# Patient Record
Sex: FEMALE | Race: WHITE | URBAN - METROPOLITAN AREA
[De-identification: names, ages, dates, MRNs, and addresses within clinical notes are randomized per-mention and may not be internally consistent; named-entity substitution may affect disease eponyms.]

---

## 2018-03-21 ENCOUNTER — OFFICE VISIT (OUTPATIENT)
Dept: ORTHOPEDIC SURGERY | Age: 68
End: 2018-03-21

## 2018-03-21 DIAGNOSIS — Z96.642 STATUS POST TOTAL REPLACEMENT OF LEFT HIP: Primary | ICD-10-CM

## 2018-03-21 DIAGNOSIS — R52 PAIN: ICD-10-CM

## 2018-03-21 DIAGNOSIS — Z96.641 STATUS POST TOTAL REPLACEMENT OF RIGHT HIP: ICD-10-CM

## 2018-03-21 PROCEDURE — 99203 OFFICE O/P NEW LOW 30 MIN: CPT | Performed by: ORTHOPAEDIC SURGERY

## 2018-03-21 RX ORDER — CELECOXIB 200 MG/1
CAPSULE ORAL
Refills: 3 | COMMUNITY
Start: 2018-02-24

## 2018-03-21 ASSESSMENT — ENCOUNTER SYMPTOMS
GASTROINTESTINAL NEGATIVE: 1
EYES NEGATIVE: 1
RESPIRATORY NEGATIVE: 1

## 2018-03-21 NOTE — PROGRESS NOTES
Scribe Authentication Statement  Herman Sicard, scribed portions of this documentation for and in the presence of Dr. Nick Frankel DO on 3/21/18 at 10:47 AM.    Provider Authentication Statement:  I, Festus Gasca DO, personally performed the services described in this documentation and they were scribed in my presence by the above listed scribe. The documentation is both accurate and complete. ORTHOPEDIC EVALUATION      History of Present Illness (HPI):   Patient's PCP is listed as: No primary care provider on file. .    This is a:  []Consult Visit     [x]New Patient Visit     []Established Patient Visit    Colin Davis is a 78y.o. year old female evaluated for  Chief Complaint   Patient presents with    Follow-up     bilateral hips     Patient is here to establish orthopedic care. She was seen in [place] by another orthopedic surgeon who performed a hip total hip replacement on the right and a total hip replacement on the left. She had no difficulties or problems with these. She moved to [place] recently and is trying to find an orthopedic surgeon to continue to monitor the progress of her new hip replacements.     Informant patient   Setting when problem first occurred home   Mechanism unknown    Location where pain is most intense Right  Hip, left hip no pain   Quality / Description none   Severity / Intensity    0   Onset none   Timing basically asymptomatic   Radiates does not radiate   Aggravating factors activity   Relieving factors avoiding painful activities   Associated manifestations denies erythema or warmth and denies numbness, tingling, weakness   Previous tests and diagnostic procedures none   Previous problems in this area Bilateral hip DJD   Previous treatment Surgery left 10/13/15, right 07/26/16 ([address]) with good improvement   Effect on patient's life none     Review of Systems (ROS):   Problem = 1 , Extended = 2-9 , Complete = 10  Unless otherwise specified in this note, the R.O.S. is reviewed today with the patient and is as below-      Review of Systems   Constitutional: Negative. HENT: Negative. Eyes: Negative. Respiratory: Negative. Cardiovascular: Negative. Gastrointestinal: Negative. Genitourinary: Negative. Musculoskeletal: Negative. Skin: Negative. Neurological: Negative. Endo/Heme/Allergies: Negative. Psychiatric/Behavioral: Negative. PAST HISTORY:   Unless otherwise specified in this note, the past history is reviewed today with the patient and is as follows-    Allergies not on file    Current Outpatient Prescriptions   Medication Sig Dispense Refill    celecoxib (CELEBREX) 200 MG capsule TAKE ONE CAPSULE BY MOUTH EVERY DAY  3     No current facility-administered medications for this visit. No past medical history on file. No past surgical history on file. No family history on file. Social History     Social History    Marital status: Single     Spouse name: N/A    Number of children: N/A    Years of education: N/A     Social History Main Topics    Smoking status: Not on file    Smokeless tobacco: Not on file    Alcohol use Not on file    Drug use: Unknown    Sexual activity: Not on file     Other Topics Concern    Not on file     Social History Narrative    No narrative on file         ORTHOPEDIC CONSTITUTION EXAM:     Vital Signs: There were no vitals taken for this visit. Constitution:  Generally, the patient is [x] alert, [x] appears stated age, and [x] in no distress. General body habitus is:   []Cachectic  []Thin  []Normal  []Overweight  [x]Obese  []Morbidly Obese     Psychiatric:   Judgement and insight is:  [x]Good    []Poor  Oriented to:  [x]person, [x]place, [x]time.   Mood for circumstances is:  [x]Appropriate   []Inappropriate    Gait and Station:   Gait is:  [x]Normal  []Antalgic   []Trendelenburg   []Wide   []Unsteady   []Other:  Assistive Device:  []Cane    []Crutches    []Walker []Wheelchair    []Gurney  Weight bearing on injured extremity:  [x]Is able   []Is not able    ORTHOPEDIC HIP EXAM:     HIP EXAM:  [x]Right []Left  Circulation:  [x] The limb is warm and well perfused. [x] Capillary refill is intact. [] Edema:    Inspection:  [x] Skin intact without abrasion or lacerations. [x] Leg lengths equal  [] Unequal leg length:  [] Ecchymosis:  [] Abrasion:  [] Laceration:   [] Scar / Surgical incision:  [] Orthopedic appliance:     Range of Motion:  [x] Normal Hip AROM     [x] Normal Hip PROM     [] Deferred due to fracture  Active Hip Flexion:  []AROM = PROM   Active Hip Extension:  []AROM = PROM   Active Hip Abduction:  []AROM = PROM   Active Hip Internal Rotation:  []AROM = PROM   Active Hip External Rotation:  []AROM = PROM   Passive Hip Flexion:  []AROM = PROM   Passive Hip Extension:  []AROM = PROM   Passive Hip Abduction:  []AROM = PROM   Passive Hip Internal Rotation:  []AROM = PROM   Passive Hip External Rotation:  []AROM = PROM     Motor Function:  [x] No gross motor weakness of hip [x] No gross motor weakness of knee  [x] No gross motor weakness of ankle    [x] No gross motor weakness of great toe  [] Motor weakness:     Neurologic:  [x] Sensation to light touch intact. [] Impaired:  [x] Deep tendon reflexes intact. [] Impaired:  [x] Coordination / proprioception intact.   [] Impaired:     Palpation: (Not tested if not marked)     Normal Tenderness Swelling Crepitation Calor   Greater Trochanter: [x] [] [] [] []   Piriformis: [x] [] [] [] []   Sacro-Iliac Joint: [x] [] [] [] []   ASIS [x] [] [] [] []   Ischial Tuberosity [x] [] [] [] []   Proximal Quadriceps: [x] [] [] [] []   Symphysis Pubis: [x] [] [] [] []   Other: [] [] [] [] []     Comment:     Provocative Tests: (Not tested if not marked)   Negative Positive Positive Findings   Straight Leg Raise: [x] []    Stinchfield Test: [x] []    [place] Test: [x] []    Piriformis Test: [x] []    Charla Test: [x] []    SHAI: [x] [] [x] []    SHAI: [x] []    FADIR: [x] []    [place] Test: [x] []    [address] Leg (Hamstrings): [x] []    Other: [] []    Other: [] []        MEDICAL DATA:   Outside record review:  historical medical records    Imaging:  Hip x-ray:  2 view(s) of the right and left hip were obtained and reviewed and show a total hip arthroplasty in acceptable alignment without signs of hardware complication. No associated fractures, dislocations, or subluxations. Pelvis x-ray:  AP view of the pelvis is obtained and reviewed and show a right and left total hip arthroplasty in acceptable alignment without signs of hardware complication. No associated fractures, dislocations, or subluxations. Leg lengths are equal as measured at the level of the lesser trochanter. ASSESSMENT:     1. Status post total replacement of left hip     2. Pain  XR HIP LEFT (2-3 VIEWS)    XR HIP RIGHT (2-3 VIEWS)   3. Status post total replacement of right hip           PLAN:   · Diagnostic and treatment options discussed. Diagnosis explained. Natural history discussed. Patient's questions answered.   · Continue dislocation precautions  · Continue infection precautions  · Return to the office in 2020 for recheck of bilateral hips